# Patient Record
Sex: MALE | Race: WHITE
[De-identification: names, ages, dates, MRNs, and addresses within clinical notes are randomized per-mention and may not be internally consistent; named-entity substitution may affect disease eponyms.]

---

## 2015-07-31 VITALS
DIASTOLIC BLOOD PRESSURE: 74 MMHG | SYSTOLIC BLOOD PRESSURE: 123 MMHG | DIASTOLIC BLOOD PRESSURE: 74 MMHG | SYSTOLIC BLOOD PRESSURE: 123 MMHG | DIASTOLIC BLOOD PRESSURE: 74 MMHG | SYSTOLIC BLOOD PRESSURE: 123 MMHG

## 2018-02-26 ENCOUNTER — HOSPITAL ENCOUNTER (OUTPATIENT)
Dept: HOSPITAL 44 - LAB | Age: 43
End: 2018-02-26
Attending: PHYSICIAN ASSISTANT
Payer: COMMERCIAL

## 2018-02-26 DIAGNOSIS — M54.9: ICD-10-CM

## 2018-02-26 DIAGNOSIS — R10.11: Primary | ICD-10-CM

## 2018-02-26 LAB
BASOPHILS NFR BLD: 0.5 % (ref 0–1.5)
EGFR (AFRICAN): > 60
EGFR (NON-AFRICAN): > 60
EOSINOPHIL NFR BLD: 2.2 % (ref 0–6.8)
MCH RBC QN AUTO: 30 PG (ref 28–34)
MCV RBC AUTO: 90.9 FL (ref 80–100)
MONOCYTES %: 5.4 % (ref 0–11)
NEUTROPHILS #: 6.8 # K/UL (ref 1.4–7.7)

## 2018-02-26 PROCEDURE — 36415 COLL VENOUS BLD VENIPUNCTURE: CPT

## 2018-02-26 PROCEDURE — 83690 ASSAY OF LIPASE: CPT

## 2018-02-26 PROCEDURE — 85025 COMPLETE CBC W/AUTO DIFF WBC: CPT

## 2018-02-26 PROCEDURE — 83036 HEMOGLOBIN GLYCOSYLATED A1C: CPT

## 2018-02-26 PROCEDURE — 80053 COMPREHEN METABOLIC PANEL: CPT

## 2018-06-15 ENCOUNTER — HOSPITAL ENCOUNTER (OUTPATIENT)
Dept: HOSPITAL 44 - LAB | Age: 43
End: 2018-06-15
Attending: PHYSICIAN ASSISTANT
Payer: COMMERCIAL

## 2018-06-15 DIAGNOSIS — R59.1: Primary | ICD-10-CM

## 2018-06-15 LAB
BASOPHILS NFR BLD: 0.5 % (ref 0–1.5)
EGFR (NON-AFRICAN): > 60
EOSINOPHIL NFR BLD: 2.6 % (ref 0–6.8)
MCH RBC QN AUTO: 30.2 PG (ref 28–34)
MCV RBC AUTO: 92.7 FL (ref 80–100)
MONOCYTES %: 5.9 % (ref 0–11)
NEUTROPHILS #: 6.7 # K/UL (ref 1.4–7.7)

## 2018-06-15 PROCEDURE — 36415 COLL VENOUS BLD VENIPUNCTURE: CPT

## 2018-06-15 PROCEDURE — 80053 COMPREHEN METABOLIC PANEL: CPT

## 2018-06-15 PROCEDURE — 86308 HETEROPHILE ANTIBODY SCREEN: CPT

## 2018-06-15 PROCEDURE — 85025 COMPLETE CBC W/AUTO DIFF WBC: CPT

## 2018-10-02 ENCOUNTER — HOSPITAL ENCOUNTER (OUTPATIENT)
Dept: HOSPITAL 44 - RAD | Age: 43
End: 2018-10-02
Attending: PHYSICIAN ASSISTANT
Payer: COMMERCIAL

## 2018-10-02 DIAGNOSIS — M79.672: Primary | ICD-10-CM

## 2018-10-02 PROCEDURE — 73650 X-RAY EXAM OF HEEL: CPT

## 2018-10-02 NOTE — DIAGNOSTIC IMAGING REPORT
RJ BILLINGS 

[facility]

[address] [address] [address]

 

 

 

 

Report Submission Date: Oct 2, 2018 11:49:12 AM CDT

Patient       Study

Name:   GERARDO BELL       Date:   Oct 2, 2018 11:04:43 AM CDT

MRN:   [id number]       Modality Type:   DX

Gender:   M       Description:   LOWER EXTREMITY

:   73       Institution:   [facility]

Physician:   RJ BILLINGS

     Accession:    [id number]

 

 

Examination: Plain film left heel. 



History: PAIN X3 WEEKS, PLANTAR FASCIITIS, NO KNOWN INJURY (Hx) 



Comparison exams: None provided 



Findings: 2 views of the left heel/calcaneus demonstrates normal cortical 
margins. No fracture.  No dislocation. No calcaneal spurs. No soft tissue 
irregularity. No joint effusion. 



Impression: No acute osseous abnormality.

 

Electronically signed on Oct 2, 2018 11:49:12 AM CDT by:

Lionel COMBS

## 2018-12-17 ENCOUNTER — HOSPITAL ENCOUNTER (EMERGENCY)
Dept: HOSPITAL 44 - ED | Age: 43
Discharge: HOME | End: 2018-12-17
Payer: COMMERCIAL

## 2018-12-17 VITALS
SYSTOLIC BLOOD PRESSURE: 132 MMHG | DIASTOLIC BLOOD PRESSURE: 97 MMHG | DIASTOLIC BLOOD PRESSURE: 97 MMHG | DIASTOLIC BLOOD PRESSURE: 97 MMHG | SYSTOLIC BLOOD PRESSURE: 132 MMHG | DIASTOLIC BLOOD PRESSURE: 97 MMHG | SYSTOLIC BLOOD PRESSURE: 132 MMHG | DIASTOLIC BLOOD PRESSURE: 97 MMHG | SYSTOLIC BLOOD PRESSURE: 132 MMHG | SYSTOLIC BLOOD PRESSURE: 132 MMHG

## 2018-12-17 DIAGNOSIS — J02.9: Primary | ICD-10-CM

## 2018-12-17 PROCEDURE — 87070 CULTURE OTHR SPECIMN AEROBIC: CPT

## 2018-12-17 PROCEDURE — 99283 EMERGENCY DEPT VISIT LOW MDM: CPT

## 2018-12-17 PROCEDURE — 87880 STREP A ASSAY W/OPTIC: CPT

## 2018-12-17 PROCEDURE — 96372 THER/PROPH/DIAG INJ SC/IM: CPT

## 2018-12-17 PROCEDURE — 87400 INFLUENZA A/B EACH AG IA: CPT

## 2018-12-17 PROCEDURE — 99282 EMERGENCY DEPT VISIT SF MDM: CPT

## 2018-12-17 NOTE — ED PHYSICIAN DOCUMENTATION
Upper Respiratory Symptoms





- HISTORIAN


Historian: patient, spouse





- HPI


Stated Complaint: Sore throat for 3 days/congestion/HA/fever


Chief Complaint: Upper Respiratory Symptoms


Onset: days ago (4)


Associated Symptoms: fever, chills, runny nose, sinus drainage, sore throat, 

productive cough


Further Comments: yes (43 year old male patient presents with complaint of sore 

throat, cough, congestion, runny nose, fever and chills for the past 4 days.  

Has used nyquil and ibuprofen x 1 dose each today.)





- ROS


CONST/EYES: denies: weakness, eye redness, eye itching


CVS/RESP: none


LYMPH: denies: leg swelling, rash, swollen glands, ankle swelling, other


GI/: none


NEURO/PSYCH: denies: fainting


MS/SKIN: denies: joint pain, muscle aches, rash, other





- PAST HX


Lung Disease: other (GERD)


Allergies/Adverse Reactions: 


                                    Allergies











Allergy/AdvReac Type Severity Reaction Status Date / Time


 


amoxicillin [From Augmentin] AdvReac  Nausea/Vomi Verified 12/17/18 23:27





   ting  


 


clavulanic acid AdvReac  Nausea/Vomi Verified 12/17/18 23:27





[From Augmentin]   ting  














Home Medications: 


                                Ambulatory Orders











 Medication  Instructions  Recorded


 


Omeprazole 40 mg PO BID 12/17/18














- SOCIAL HX


Smoking History: non-smoker





- FAMILY HX


Family History: denies: none





- VITAL SIGNS


Vital Signs: 





                                   Vital Signs











Temp Pulse Resp BP Pulse Ox


 


 98.9 F   107 H  16   132/97   95 


 


 12/17/18 22:48  12/17/18 22:48  12/17/18 22:48  12/17/18 22:48  12/17/18 22:48














- REVIEWED ASSESSMENTS


Nursing Assessment  Reviewed: Yes


Vitals Reviewed: Yes





Progress





- Progress


Progress: 





"Allergy" to augmentin is nausea with po augmentin. 





Patient c/o pain with swallowing POs. 





ED Results Lab/Radiology





- Orders


Orders: 





                                    ED Orders











 Category Date Time Status


 


 Ketorolac Tromethamine [Toradol] Med  12/17/18 23:42 Once





 60 mg IM NOW ONE   


 


 Penicillin G Benzathine [Bicillin l-A] Med  12/17/18 23:42 Once





 1,200,000 units IM NOW ONE   














Upper Respiratory Symptoms





- EXAM


General Appearance: moderate distress


EENT: eyes nml inspection, lids & conjunct. nml, PERRL, ear nml, nose nml, 

airway nml, pharyngeal erythema, tonsillar exudate, tonsillar swelling, hoarse 

voice


Respiratory: no resp. distress, breath sounds nml, no pain on inspiration, 

speaks full sentences, no pleuritic chest pain


Abdomen: non-tender, no organomegaly, nml bowel sounds, no distention


CVS: reg rate & rhythm, heart sounds normal, equal pulses, no murmur, no gallop,

PMI nml, no JVD, no friction rub, 24


Skin: color nml, no rash, warm,dry


Extremities: non-tender, normal range of motion, no evidence of injury, no 

edema, J, NP


Neuro/Psych: oriented x3, neuro intact, mood/affect nml, CN's nml as tested





Discharge


Clincal Impression: 


Pharyngitis


Qualifiers:


 Pharyngitis/tonsillitis etiology: unspecified etiology Qualified Code(s): J02.9

- Acute pharyngitis, unspecified





Referrals: 


Nevin Mosqueda PA [Primary Care Provider] - 2 Days


Additional Instructions: 


You have been treated in the ER with a antibiotic shot; you do not need further 

oral antibiotics. 


Chloraseptic spray or lozenges as needed for throat pain.  


Warm salt water gargles as needed pain 


Increase your fluid intake  juices, hot tea, non-caffeinated beverages


If you are congested - You may want to try Vicks rub on your chest and/or feet


Use a humidifier in the room where you sleep.  You can also sit in a steam 

filled bathroom 1-2 times a day.  


 an over the counter decongestant such as pseudoped, dayquil and Nyquil 

at your pharmacy. You may want to try Vicks rub on your chest and/or feet.  

(Caution: Dayquil and Nyquil contain 325mg of tyelnol/acetaminophen per 

tablespoon)





Cough drops as needed for cough and sore throat. 





Increase your fluid intake  juices, hot tea, non-caffeinated beverages





Vitamin C  may be helpful in decreasing the length of your cold.  





Use a humidifier in the room where you sleep.  You can also sit in a steam 

filled bathroom 1-2 times a day.  





Tylenol every 4 hours 650mg -1000mg (do not exceed 4000mg in 24 hours) as needed

for fever, pain and body aches.  





Alternate with Ibuprofen





Ibuprofen 600-800mg every 6 hours as needed for fever, pain and body aches.


Condition: Stable


Disposition: 01 HOME, SELF-CARE


Decision to Admit: NO


Decision Time: 23:48

## 2019-02-13 ENCOUNTER — HOSPITAL ENCOUNTER (OUTPATIENT)
Dept: HOSPITAL 44 - LAB | Age: 44
End: 2019-02-13
Attending: FAMILY MEDICINE
Payer: COMMERCIAL

## 2019-02-13 DIAGNOSIS — Z87.891: ICD-10-CM

## 2019-02-13 DIAGNOSIS — R06.09: ICD-10-CM

## 2019-02-13 DIAGNOSIS — I10: Primary | ICD-10-CM

## 2019-02-13 LAB
BASOPHILS NFR BLD: 0.8 % (ref 0–1.5)
EGFR (NON-AFRICAN): > 60
EOSINOPHIL NFR BLD: 1.9 % (ref 0–6.8)
MCH RBC QN AUTO: 29.5 PG (ref 28–34)
MCV RBC AUTO: 90 FL (ref 80–100)
MONOCYTES %: 8.5 % (ref 0–11)
NEUTROPHILS #: 5.6 # K/UL (ref 1.4–7.7)

## 2019-02-13 PROCEDURE — 85025 COMPLETE CBC W/AUTO DIFF WBC: CPT

## 2019-02-13 PROCEDURE — 36415 COLL VENOUS BLD VENIPUNCTURE: CPT

## 2019-02-13 PROCEDURE — 71046 X-RAY EXAM CHEST 2 VIEWS: CPT

## 2019-02-13 PROCEDURE — 80053 COMPREHEN METABOLIC PANEL: CPT

## 2019-02-13 NOTE — DIAGNOSTIC IMAGING REPORT
RITIKA LIN 

[facility]

[address] [facility].[address]. [address]

 

 

 

 

Report Submission Date: 2019 12:15:33 PM CST

Patient       Study

Name:   GERARDO BELL       Date:   2019 12:01:38 PM CST

MRN:   [id number]       Modality Type:   DX

Gender:   M       Description:   CHEST 2VIEW

:   73       Institution:   [facility]

Physician:   RITIKA LIN

     Accession:    [id number]

 

 

Examination: PA and lateral chest. 



History: Evaluate lung fields.  Chest pain cough smoker 



Comparison exam: None provided. 



Findings: PA and lateral views of the chest demonstrates a normal cardiac and 
mediastinal silhouette. No focal infiltrate.  No blunting of the costophrenic 
margins.  Osseous structures are appropriate for age. 



Impression: No acute pulmonary process.

 

Electronically signed on 2019 12:15:33 PM CST by:

Lionel COMBS

## 2019-03-15 ENCOUNTER — HOSPITAL ENCOUNTER (OUTPATIENT)
Dept: HOSPITAL 44 - LAB | Age: 44
End: 2019-03-15
Attending: FAMILY MEDICINE
Payer: COMMERCIAL

## 2019-03-15 DIAGNOSIS — E29.1: ICD-10-CM

## 2019-03-15 DIAGNOSIS — R63.5: Primary | ICD-10-CM

## 2019-03-15 PROCEDURE — 84443 ASSAY THYROID STIM HORMONE: CPT

## 2019-03-15 PROCEDURE — 84402 ASSAY OF FREE TESTOSTERONE: CPT

## 2019-03-15 PROCEDURE — 36415 COLL VENOUS BLD VENIPUNCTURE: CPT

## 2019-03-25 ENCOUNTER — HOSPITAL ENCOUNTER (OUTPATIENT)
Dept: HOSPITAL 44 - LAB | Age: 44
End: 2019-03-25
Attending: FAMILY MEDICINE
Payer: COMMERCIAL

## 2019-03-25 DIAGNOSIS — E29.1: Primary | ICD-10-CM

## 2019-03-25 PROCEDURE — 83002 ASSAY OF GONADOTROPIN (LH): CPT

## 2019-03-25 PROCEDURE — 83001 ASSAY OF GONADOTROPIN (FSH): CPT

## 2019-03-25 PROCEDURE — 36415 COLL VENOUS BLD VENIPUNCTURE: CPT

## 2019-09-16 ENCOUNTER — HOSPITAL ENCOUNTER (OUTPATIENT)
Dept: HOSPITAL 44 - LABRHC | Age: 44
End: 2019-09-16
Attending: FAMILY MEDICINE
Payer: COMMERCIAL

## 2019-09-16 DIAGNOSIS — J02.9: Primary | ICD-10-CM

## 2019-09-16 PROCEDURE — 87070 CULTURE OTHR SPECIMN AEROBIC: CPT
